# Patient Record
Sex: FEMALE | URBAN - METROPOLITAN AREA
[De-identification: names, ages, dates, MRNs, and addresses within clinical notes are randomized per-mention and may not be internally consistent; named-entity substitution may affect disease eponyms.]

---

## 2023-07-28 ENCOUNTER — LAB REQUISITION (OUTPATIENT)
Dept: LAB | Facility: CLINIC | Age: 83
End: 2023-07-28

## 2023-07-28 PROCEDURE — 82306 VITAMIN D 25 HYDROXY: CPT | Performed by: PHYSICIAN ASSISTANT

## 2023-07-31 LAB — DEPRECATED CALCIDIOL+CALCIFEROL SERPL-MC: 30 UG/L (ref 20–75)

## 2023-08-30 ENCOUNTER — TELEPHONE (OUTPATIENT)
Dept: FAMILY MEDICINE | Facility: CLINIC | Age: 83
End: 2023-08-30

## 2023-08-30 NOTE — TELEPHONE ENCOUNTER
Ok to put in same day hold with me or another new provider.    Desire Blank, DO  Internal Medicine - Pediatrics Physician  [facility]

## 2023-08-30 NOTE — TELEPHONE ENCOUNTER
LVM to call back to connect with care team to schedule utilizing message below from provider-any member of care team can complete-

## 2023-08-30 NOTE — TELEPHONE ENCOUNTER
PT's daughter ,negro Nix. # [phone]. OK to leave a detailed message.    PT has been seen for all her care at [facility] in [place], [place].  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    [facility] told pt she needs a local PCP to see her. They can't see pt at [facility].    PT has never been seen within [facility]. She would like to establish.  Pt wants to be seen at the [facility].   Earliest appt for a new pt is 10/9/23. Daughter really wants pt seen sooner than that.    Can pt be seen in a Same Day Hold appt with a provider taking new patients?  Can I get an approval for this from a [facility] provider?    PAM Cerrato